# Patient Record
(demographics unavailable — no encounter records)

---

## 2025-02-25 NOTE — HISTORY OF PRESENT ILLNESS
[Mother] : mother [Yes] : Patient goes to dentist yearly [Toothpaste] : Primary Fluoride Source: Toothpaste [Up to date] : Up to date [Normal] : normal [Days of Bleeding: _____] : Days of bleeding: [unfilled] [Cycle Length: _____ days] : Cycle Length: [unfilled] days [Age of Menarche: ____] : Age of Menarche: [unfilled] [Painful Cramps] : painful cramps [Hirsutism] : hirsutism [Eats meals with family] : eats meals with family [Has family members/adults to turn to for help] : has family members/adults to turn to for help [Sleep Concerns] : sleep concerns [Grade: ____] : Grade: [unfilled] [Normal Performance] : normal performance [Normal Behavior/Attention] : normal behavior/attention [Normal Homework] : normal homework [Eats regular meals including adequate fruits and vegetables] : eats regular meals including adequate fruits and vegetables [Drinks non-sweetened liquids] : drinks non-sweetened liquids  [Calcium source] : calcium source [Has friends] : has friends [At least 1 hour of physical activity a day] : at least 1 hour of physical activity a day [Screen time (except homework) less than 2 hours a day] : screen time (except homework) less than 2 hours a day [Has interests/participates in community activities/volunteers] : has interests/participates in community activities/volunteers. [Uses safety belts/safety equipment] : uses safety belts/safety equipment  [Has peer relationships free of violence] : has peer relationships free of violence [No] : Patient has not had sexual intercourse [Has ways to cope with stress] : has ways to cope with stress [Displays self-confidence] : displays self-confidence [With Teen] : teen [With Parent/Guardian] : parent/guardian [Irregular menses] : no irregular menses [Heavy Bleeding] : no heavy bleeding [Acne] : no acne [Has concerns about body or appearance] : does not have concerns about body or appearance [Uses electronic nicotine delivery system] : does not use electronic nicotine delivery system [Exposure to electronic nicotine delivery system] : no exposure to electronic nicotine delivery system [Uses tobacco] : does not use tobacco [Exposure to tobacco] : no exposure to tobacco [Uses drugs] : does not use drugs  [Exposure to drugs] : no exposure to drugs [Drinks alcohol] : does not drink alcohol [Exposure to alcohol] : no exposure to alcohol [Impaired/distracted driving] : no impaired/distracted driving [Has problems with sleep] : does not have problems with sleep [Gets depressed, anxious, or irritable/has mood swings] : does not get depressed, anxious, or irritable/has mood swings [Has thought about hurting self or considered suicide] : has not thought about hurting self or considered suicide [FreeTextEntry7] : Patient has seen a dermatologist, optometry, and a dentist. Generally, she has been healthy.  [de-identified] : Patient has developed stretch marks on her abdomen  [FreeTextEntry8] : Very mild hirsutism. Vomits on first day of bleeding some months  [FreeTextEntry1] : Patient is a generally healthy 12yo F w/ dyslexia, the need for glasses, and abdominal stretch marks. Patient has generally been doing well. She skips breakfast most days, has school foods (usually pizza) for lunch, and for dinner she has her mother cooking or chicken nuggets (usually frozen). Vegetables she likes include carrots, celery, lettuce, and tomatoes. She does not snack. She drinks seltzer which may or may not include added sugar. She runs track, participates in drama, and sometimes plays soccer. She is on no home medications. Her menstruation is regular. She has 5-6 days of bleeding and has a cycle every month. She generally has cramps, but they are tolerable. She also regularly vomits on her first day of bleeding, but it does not bother her. She rarely has headaches. Her bowel movements are regular, daily. Her mom enforces a 9pm bedtime and takes her phone. She usually falls asleep around 10-11pm. She usually draw in her room before bed. She wakes up at 6am. Immediately upon waking she is tired, but then has good energy throughout the day. She does not snore while sleeping.   She saw a dentist last month who referred her to orthodontics for braces. She brushes her teeth 2x/day. She also recently saw an optometrist who prescribed glasses that need to be picked up. School is going well this year. Patient gets extra time on exams for dyslexia. She saw a dermatologist for stretch marks on her abdomen who prescribed hydrocort.

## 2025-02-25 NOTE — DISCUSSION/SUMMARY
[Normal Development] : development  [No Elimination Concerns] : elimination [Normal Sleep Pattern] : sleep [Anticipatory Guidance Given] : Anticipatory guidance addressed as per the history of present illness section [No Medications] : ~He/She~ is not on any medications [Patient] : patient [Mother] : mother [Full Activity without restrictions including Physical Education & Athletics] : Full Activity without restrictions including Physical Education & Athletics [de-identified] : Gained 10 kg in last year [FreeTextEntry1] : Patient is a generally healthy 12yo F coming into clinic for her 12yo WCC.   #BMI 96%ile  - Diet heavily discussed. Awareness of added sugars and exchanging certain foods for more vegetables was discussed.  - Patient is already physically active  - Lipid screening, HbA1c, and thyroid panal sent   #Vaccines  - HPV and flu vax given today  - Patient plans to recieve 2nd dose of HPV at her 14y WCC   # Abdominal Stria  - Likely 2/2 weight gain, no other risk factors such as recent steroid use  - Can continue hydrocort per derm   # Visual Impairment  - Patient's mother needs to  new glasses   # Depression and Anxiety screening  - Scored a 4 on the MASHA-7, patient has good coping skills and no concerns for her anxiety level  - Patient also scored low on PHQ-9, no concerns for depression at this time  - Screens discussed with patient alone as well as patient  - Patient's mom tentative to leave the room for HEADSS, but on reassurance that she would be updated if there were concerning findings of importance for her to know, she was willing to step out.

## 2025-02-25 NOTE — PHYSICAL EXAM
[Alert] : alert [No Acute Distress] : no acute distress [Normocephalic] : normocephalic [EOMI Bilateral] : EOMI bilateral [Clear tympanic membranes with bony landmarks and light reflex present bilaterally] : clear tympanic membranes with bony landmarks and light reflex present bilaterally  [Pink Nasal Mucosa] : pink nasal mucosa [Nonerythematous Oropharynx] : nonerythematous oropharynx [Supple, full passive range of motion] : supple, full passive range of motion [No Palpable Masses] : no palpable masses [Clear to Auscultation Bilaterally] : clear to auscultation bilaterally [Regular Rate and Rhythm] : regular rate and rhythm [Normal S1, S2 audible] : normal S1, S2 audible [No Murmurs] : no murmurs [+2 Femoral Pulses] : +2 femoral pulses [Soft] : soft [NonTender] : non tender [Non Distended] : non distended [Normoactive Bowel Sounds] : normoactive bowel sounds [No Hepatomegaly] : no hepatomegaly [No Splenomegaly] : no splenomegaly [Doug: ____] : Doug [unfilled] [Doug: _____] : Doug [unfilled] [No Abnormal Lymph Nodes Palpated] : no abnormal lymph nodes palpated [Normal Muscle Tone] : normal muscle tone [No Gait Asymmetry] : no gait asymmetry [No pain or deformities with palpation of bone, muscles, joints] : no pain or deformities with palpation of bone, muscles, joints [Straight] : straight [+2 Patella DTR] : +2 patella DTR [Cranial Nerves Grossly Intact] : cranial nerves grossly intact [de-identified] : Abdominal stria. Pubic hair that extends half-way from the mons to the umbillicus

## 2025-03-19 NOTE — PHYSICAL EXAM
[Alert] : alert [EOMI] : grossly EOMI [Pink Nasal Mucosa] : pink nasal mucosa [FROM] : full passive range of motion [Clear to Auscultation Bilaterally] : clear to auscultation bilaterally [Regular Rate and Rhythm] : regular rate and rhythm [Normal S1, S2 audible] : normal S1, S2 audible [No Abnormal Lymph Nodes Palpated] : no abnormal lymph nodes palpated [Moves All Extremities x 4] : moves all extremities x4 [Warm, Well Perfused x4] : warm, well perfused x4 [Normotonic] : normotonic [Warm] : warm [Clear] : clear [Acute Distress] : no acute distress [Wheezing] : no wheezing [Rales] : no rales [Crackles] : no crackles [de-identified] : No warmth or redness to the RUE. Sensation intact. 5/5 upper extremity strength. No point TTP. No deformity or bumps palpated

## 2025-03-19 NOTE — HISTORY OF PRESENT ILLNESS
[de-identified] : Arm Pain [FreeTextEntry6] : 12 y/o F with no significant PMHx who presented with 2 days of R arm pain. Patient reports that she was in her usual state of health when she developed R upper arm pain 2 days ago. Mom states that her armpit looked red, so she applied a warm compress and Vaseline. No bump in the area at that time. That area of redness is now gone, but patient states that her deltoid has intermittent pain like she "just got shot." Last vaccines in February in the L deltoid. No warmth or redness to the area. Pain is intermittent - no pain at rest. Occurs with movement. Has not tried OTC pain medications, but did state that the warm compress helped. Pain does not wake up from sleep. No strength or sensation changes. No trauma, new activities, weight loss, fever, cough, congestion, runny nose.

## 2025-03-19 NOTE — HISTORY OF PRESENT ILLNESS
[de-identified] : Arm Pain [FreeTextEntry6] : 12 y/o F with no significant PMHx who presented with 2 days of R arm pain. Patient reports that she was in her usual state of health when she developed R upper arm pain 2 days ago. Mom states that her armpit looked red, so she applied a warm compress and Vaseline. No bump in the area at that time. That area of redness is now gone, but patient states that her deltoid has intermittent pain like she "just got shot." Last vaccines in February in the L deltoid. No warmth or redness to the area. Pain is intermittent - no pain at rest. Occurs with movement. Has not tried OTC pain medications, but did state that the warm compress helped. Pain does not wake up from sleep. No strength or sensation changes. No trauma, new activities, weight loss, fever, cough, congestion, runny nose.

## 2025-03-19 NOTE — PHYSICAL EXAM
[Alert] : alert [EOMI] : grossly EOMI [Pink Nasal Mucosa] : pink nasal mucosa [FROM] : full passive range of motion [Clear to Auscultation Bilaterally] : clear to auscultation bilaterally [Regular Rate and Rhythm] : regular rate and rhythm [Normal S1, S2 audible] : normal S1, S2 audible [No Abnormal Lymph Nodes Palpated] : no abnormal lymph nodes palpated [Moves All Extremities x 4] : moves all extremities x4 [Warm, Well Perfused x4] : warm, well perfused x4 [Normotonic] : normotonic [Warm] : warm [Clear] : clear [Acute Distress] : no acute distress [Wheezing] : no wheezing [Rales] : no rales [Crackles] : no crackles [de-identified] : No warmth or redness to the RUE. Sensation intact. 5/5 upper extremity strength. No point TTP. No deformity or bumps palpated

## 2025-03-19 NOTE — DISCUSSION/SUMMARY
[FreeTextEntry1] : 12 y/o F with no significant PMHx who presented with R upper arm pain x 2 days. No constitutional symptoms and on exam patient is well appearing. No point tenderness and neurologic findings. Recommend to trial NSAIDs and monitor closely. If pain persists, call in 1 week for further discussion and potential XR for further evaluation.

## 2025-03-19 NOTE — DISCUSSION/SUMMARY
[FreeTextEntry1] : 14 y/o F with no significant PMHx who presented with R upper arm pain x 2 days. No constitutional symptoms and on exam patient is well appearing. No point tenderness and neurologic findings. Recommend to trial NSAIDs and monitor closely. If pain persists, call in 1 week for further discussion and potential XR for further evaluation.